# Patient Record
Sex: MALE | Race: WHITE | NOT HISPANIC OR LATINO | Employment: FULL TIME | URBAN - METROPOLITAN AREA
[De-identification: names, ages, dates, MRNs, and addresses within clinical notes are randomized per-mention and may not be internally consistent; named-entity substitution may affect disease eponyms.]

---

## 2019-07-10 ENCOUNTER — OFFICE VISIT (OUTPATIENT)
Dept: GASTROENTEROLOGY | Facility: CLINIC | Age: 29
End: 2019-07-10
Payer: COMMERCIAL

## 2019-07-10 VITALS
HEART RATE: 76 BPM | HEIGHT: 70 IN | WEIGHT: 209 LBS | DIASTOLIC BLOOD PRESSURE: 74 MMHG | SYSTOLIC BLOOD PRESSURE: 134 MMHG | BODY MASS INDEX: 29.92 KG/M2

## 2019-07-10 DIAGNOSIS — Z72.89 ALCOHOL USE: ICD-10-CM

## 2019-07-10 DIAGNOSIS — R42 DIZZINESS: Primary | ICD-10-CM

## 2019-07-10 PROCEDURE — 99244 OFF/OP CNSLTJ NEW/EST MOD 40: CPT | Performed by: INTERNAL MEDICINE

## 2019-07-10 RX ORDER — ESCITALOPRAM OXALATE 20 MG/1
20 TABLET ORAL DAILY
Refills: 3 | COMMUNITY
Start: 2019-06-23

## 2019-07-10 RX ORDER — MULTIVITAMIN
1 TABLET ORAL DAILY
COMMUNITY

## 2019-07-10 NOTE — PROGRESS NOTES
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  Patient and his wife feel that he exhibits signs of being intoxicated even when he is not drinking  I did explain to them that there is limited literature regarding this syndrome  However, given that the symptoms are intermittent in nature, I recommended that for the next 4 weeks, he stays completely abstinent from alcohol, do no more than 2 servings of carbohydrates day, and if despite this his symptoms resume, will check his blood work including LFTs, ethanol, and hemoglobin A1c     - Comprehensive metabolic panel  - Hemoglobin A1C  - Ethanol    2  Alcohol use  Drinks alcohol couple times a week  Given that this would confuse the picture, I recommend that he stays completely abstinent from all alcohol for the next 4 weeks  - Comprehensive metabolic panel; Future  - Hemoglobin A1C; Future  - Ethanol; Future      Followup Appointment[de-identified]  4 weeks  ______________________________________________________________________    Chief Complaint   Patient presents with    Dizziness    Gait Problem    Fatigue    Smells like alcohol       HPI:   Kin Juarez is a 33y o  year old male who presents today as a new patient at the request of Dr Hernandez for 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  Patient is here today with his wife  Patient states that he drinks alcohol couple times a week  He has been under lot of stress recently  His anxiety medication has been increased recently  He has put on about 15 lb in the past year  He and his wife state that even when he is not drinking, especially on Sundays, he exhibits signs of intoxication: slurred speech, fatigue, dizziness, lightheadedness    Wife also feels that she can small alcohol on him when he has not been drinking  Patient denies any issues with nausea vomiting, dysphagia, heartburn, abdominal pain, abdominal bloating, diarrhea, changes in bowel habits, GI bleeding  Historical Information   Past Medical History:   Diagnosis Date    Anxiety     Pericarditis      Past Surgical History:   Procedure Laterality Date    WISDOM TOOTH EXTRACTION       Social History     Substance and Sexual Activity   Alcohol Use Yes    Frequency: 2-3 times a week     Social History     Substance and Sexual Activity   Drug Use Never     Social History     Tobacco Use   Smoking Status Former Smoker    Packs/day: 0 50    Years: 10 00    Pack years: 5 00    Types: Cigarettes    Last attempt to quit:     Years since quittin 5   Smokeless Tobacco Never Used     Family History   Problem Relation Age of Onset    Colon cancer Neg Hx     Colon polyps Neg Hx     Inflammatory bowel disease Neg Hx        Meds/Allergies     Current Outpatient Medications:     escitalopram (LEXAPRO) 20 mg tablet    Multiple Vitamin (MULTIVITAMIN) tablet    No Known Allergies    PHYSICAL EXAM:    Blood pressure 134/74, pulse 76, height 5' 10" (1 778 m), weight 94 8 kg (209 lb)  Body mass index is 29 99 kg/m²  General Appearance: NAD, cooperative, alert  HEENT:  Normocephalic, atraumatic, anicteric  Neck:  Supple, symmetrical, trachea midline  Lungs:  Clear to auscultation bilaterally; no rales, rhonchi or wheezing; respirations unlabored   Heart[de-identified]  Regular rate and rhythm; no murmur, rub, or gallop    Abdomen:  Soft, non-tender, non-distended; normal bowel sounds; no masses, no organomegaly   Rectal:  Deferred   Extremities: No cyanosis, clubbing or edema   Skin:  No jaundice, rashes, or lesions   Lymph nodes: No palpable cervical lymphadenopathy  Psych: Normal affect, good eye contact  Neuro: No gross deficits, AAOx3    Lab Results:   No results found for: WBC, HGB, HCT, MCV, PLT  No results found for: NA, K, CL, CO2, ANIONGAP, BUN, CREATININE, GLUCOSE, GLUF, CALCIUM, CORRECTEDCA, AST, ALT, ALKPHOS, PROT, BILITOT, EGFR  No results found for: IRON, TIBC, FERRITIN  No results found for: LIPASE    Radiology Results:   No results found  REVIEW OF SYSTEMS:    CONSTITUTIONAL: Denies any fever, chills, rigors, and weight loss  HEENT: No earache or tinnitus  Denies hearing loss or visual disturbances  CARDIOVASCULAR: No chest pain or palpitations  RESPIRATORY: Denies any cough, hemoptysis, shortness of breath or dyspnea on exertion  GASTROINTESTINAL: As noted in the History of Present Illness  GENITOURINARY: No problems with urination  Denies any hematuria or dysuria  NEUROLOGIC:  Per HPI  MUSCULOSKELETAL: Denies any muscle or joint pain  SKIN: Denies skin rashes or itching  ENDOCRINE: Denies excessive thirst  Denies intolerance to heat or cold  PSYCHOSOCIAL: Denies depression  + anxiety  Denies any recent memory loss

## 2019-07-10 NOTE — LETTER
July 10, 2019     Soraya Days, DO  [id number]    Patient: Isaías Soares   YOB: 1990   Date of Visit: 7/10/2019       Dear Dr Navneet Dickerson: Thank you for referring Isaías Soares to me for evaluation  Below are my notes for this consultation  If you have questions, please do not hesitate to call me  I look forward to following your patient along with you  Sincerely,        Seymour Lacy MD        CC: No Recipients  Seymour Lacy MD  7/10/2019 12:34 PM  Incomplete    [address] [facility] - Outpatient Consultation  Isaías Soares 33 y o  male MRN: [id number]  Encounter: [id number]    ASSESSMENT AND PLAN:      1  Dizziness  81-year-old male here today as a new patient at the request of Dr Navneet Dickerson for 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  Patient and his wife feel that he exhibits signs of being intoxicated even when he is not drinking  I did explain to them that there is limited literature regarding this syndrome  However, given that the symptoms are intermittent in nature, I recommended that for the next 4 weeks, he stays completely abstinent from alcohol, do no more than 2 servings of carbohydrates day, and if despite this his symptoms resume, will check his blood work including LFTs, ethanol, and hemoglobin A1c     - Comprehensive metabolic panel  - Hemoglobin A1C  - Ethanol    2  Alcohol use  Drinks alcohol couple times a week  Given that this would confuse the picture, I recommend that he stays completely abstinent from all alcohol for the next 4 weeks  - Comprehensive metabolic panel; Future  - Hemoglobin A1C; Future  - Ethanol;  Future      Followup Appointment[de-identified]  4 weeks  ______________________________________________________________________    Chief Complaint   Patient presents with    Dizziness    Gait Problem    Fatigue    Smells like alcohol       HPI:   Isaías Soares is a 33y o  year old male who presents today as a new patient at the request of Len Montanez for 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  Patient is here today with his wife  Patient states that he drinks alcohol couple times a week  He has been under lot of stress recently  His anxiety medication has been increased recently  He has put on about 15 lb in the past year  He and his wife state that even when he is not drinking, especially on Sundays, he exhibits signs of intoxication: slurred speech, fatigue, dizziness, lightheadedness  Wife also feels that she can small alcohol on him when he has not been drinking  Patient denies any issues with nausea vomiting, dysphagia, heartburn, abdominal pain, abdominal bloating, diarrhea, changes in bowel habits, GI bleeding  Historical Information   Past Medical History:   Diagnosis Date    Anxiety     Pericarditis      Past Surgical History:   Procedure Laterality Date    WISDOM TOOTH EXTRACTION       Social History     Substance and Sexual Activity   Alcohol Use Yes    Frequency: 2-3 times a week     Social History     Substance and Sexual Activity   Drug Use Never     Social History     Tobacco Use   Smoking Status Former Smoker    Packs/day: 0 50    Years: 10 00    Pack years: 5 00    Types: Cigarettes    Last attempt to quit:     Years since quittin 5   Smokeless Tobacco Never Used     Family History   Problem Relation Age of Onset    Colon cancer Neg Hx     Colon polyps Neg Hx     Inflammatory bowel disease Neg Hx        Meds/Allergies     Current Outpatient Medications:     escitalopram (LEXAPRO) 20 mg tablet    Multiple Vitamin (MULTIVITAMIN) tablet    No Known Allergies    PHYSICAL EXAM:    Blood pressure 134/74, pulse 76, height 5' 10" (1 778 m), weight 94 8 kg (209 lb)  Body mass index is 29 99 kg/m²  General Appearance: NAD, cooperative, alert  HEENT:  Normocephalic, atraumatic, anicteric      Neck:  Supple, symmetrical, trachea midline  Lungs:  Clear to auscultation bilaterally; no rales, rhonchi or wheezing; respirations unlabored   Heart[de-identified]  Regular rate and rhythm; no murmur, rub, or gallop  Abdomen:  Soft, non-tender, non-distended; normal bowel sounds; no masses, no organomegaly   Rectal:  Deferred   Extremities: No cyanosis, clubbing or edema   Skin:  No jaundice, rashes, or lesions   Lymph nodes: No palpable cervical lymphadenopathy  Psych: Normal affect, good eye contact  Neuro: No gross deficits, AAOx3    Lab Results:   No results found for: WBC, HGB, HCT, MCV, PLT  No results found for: NA, K, CL, CO2, ANIONGAP, BUN, CREATININE, GLUCOSE, GLUF, CALCIUM, CORRECTEDCA, AST, ALT, ALKPHOS, PROT, BILITOT, EGFR  No results found for: IRON, TIBC, FERRITIN  No results found for: LIPASE    Radiology Results:   No results found  REVIEW OF SYSTEMS:    CONSTITUTIONAL: Denies any fever, chills, rigors, and weight loss  HEENT: No earache or tinnitus  Denies hearing loss or visual disturbances  CARDIOVASCULAR: No chest pain or palpitations  RESPIRATORY: Denies any cough, hemoptysis, shortness of breath or dyspnea on exertion  GASTROINTESTINAL: As noted in the History of Present Illness  GENITOURINARY: No problems with urination  Denies any hematuria or dysuria  NEUROLOGIC:  Per HPI  MUSCULOSKELETAL: Denies any muscle or joint pain  SKIN: Denies skin rashes or itching  ENDOCRINE: Denies excessive thirst  Denies intolerance to heat or cold  PSYCHOSOCIAL: Denies depression  + anxiety  Denies any recent memory loss

## 2019-07-27 LAB
ALBUMIN SERPL-MCNC: 5.4 G/DL (ref 3.5–5.5)
ALBUMIN/GLOB SERPL: 2.6 {RATIO} (ref 1.2–2.2)
ALP SERPL-CCNC: 77 IU/L (ref 39–117)
ALT SERPL-CCNC: 50 IU/L (ref 0–44)
AST SERPL-CCNC: 44 IU/L (ref 0–40)
BILIRUB SERPL-MCNC: 0.4 MG/DL (ref 0–1.2)
BUN SERPL-MCNC: 16 MG/DL (ref 6–20)
BUN/CREAT SERPL: 17 (ref 9–20)
CALCIUM SERPL-MCNC: 9.7 MG/DL (ref 8.7–10.2)
CHLORIDE SERPL-SCNC: 96 MMOL/L (ref 96–106)
CO2 SERPL-SCNC: 21 MMOL/L (ref 20–29)
CREAT SERPL-MCNC: 0.96 MG/DL (ref 0.76–1.27)
EST. AVERAGE GLUCOSE BLD GHB EST-MCNC: 100 MG/DL
ETHANOL BLD GC-MCNC: NEGATIVE %
GLOBULIN SER-MCNC: 2.1 G/DL (ref 1.5–4.5)
GLUCOSE SERPL-MCNC: 90 MG/DL (ref 65–99)
HBA1C MFR BLD: 5.1 % (ref 4.8–5.6)
POTASSIUM SERPL-SCNC: 3.9 MMOL/L (ref 3.5–5.2)
PROT SERPL-MCNC: 7.5 G/DL (ref 6–8.5)
SL AMB EGFR AFRICAN AMERICAN: 125 ML/MIN/1.73
SL AMB EGFR NON AFRICAN AMERICAN: 108 ML/MIN/1.73
SODIUM SERPL-SCNC: 137 MMOL/L (ref 134–144)

## 2019-07-29 NOTE — RESULT ENCOUNTER NOTE
Elevated AST/ALT  Has follow up with me next week  Will review and will need to work up abnormal LFTs

## 2019-08-08 ENCOUNTER — OFFICE VISIT (OUTPATIENT)
Dept: GASTROENTEROLOGY | Facility: CLINIC | Age: 29
End: 2019-08-08
Payer: COMMERCIAL

## 2019-08-08 VITALS
WEIGHT: 201 LBS | BODY MASS INDEX: 28.77 KG/M2 | HEIGHT: 70 IN | DIASTOLIC BLOOD PRESSURE: 90 MMHG | SYSTOLIC BLOOD PRESSURE: 142 MMHG | HEART RATE: 74 BPM

## 2019-08-08 DIAGNOSIS — Z72.89 ALCOHOL USE: ICD-10-CM

## 2019-08-08 DIAGNOSIS — R79.89 ELEVATED LFTS: Primary | ICD-10-CM

## 2019-08-08 DIAGNOSIS — R42 DIZZINESS: ICD-10-CM

## 2019-08-08 PROCEDURE — 99214 OFFICE O/P EST MOD 30 MIN: CPT | Performed by: INTERNAL MEDICINE

## 2019-08-08 RX ORDER — NAPROXEN 250 MG/1
250 TABLET ORAL 2 TIMES DAILY WITH MEALS
COMMUNITY

## 2019-08-08 NOTE — PROGRESS NOTES
[facility] - Outpatient Follow-up Note  Lc Cooley 33 y o  male MRN: [id number]  Encounter: [id number]    ASSESSMENT AND PLAN:      1  Elevated LFTs  Abnormal LFTs noted on recent blood work  Will workup  Recommend ongoing alcohol cessation     - Comprehensive metabolic panel; Future  - CBC and differential; Future  - Hepatitis B surface antigen; Future  - Hepatitis A antibody, total; Future  - Hepatitis B core antibody, IgM; Future  - Hepatitis B surface antibody; Future  - Hepatitis C antibody; Future  - CALIN Screen w/ Reflex to Titer/Pattern; Future  - Antimitochondrial antibody; Future  - Anti-smooth muscle antibody, IgG; Future  - Celiac Disease Antibody Profile; Future  - Ceruloplasmin; Future  - Iron Saturation %; Future  - Ferritin; Future  - US abdomen complete; Future    2  Dizziness  Has been on a low carb, less than 25 g per day, no alcohol for the past 4 weeks  Still had couple episodes  We had blood drawn during these episodes but blood alcohol level is undetectable  So not really clear that he is having auto brewery syndrome  At this point, I recommend that he follows up with a neurologist to rule out focal seizures given his episodes  If Neurology workup otherwise unremarkable, would recommend trying a lactulose breath test to assess for small intestinal bacterial overgrowth as there are some literature supporting abnormal bacterial from mentation from the gut  Also, consider doing the glucose and alcohol protocol after high sugar load  3  Alcohol use  Continue abstinence      Followup Appointment:  3 months  ______________________________________________________________________    Chief Complaint   Patient presents with    Follow up to Labs     HPI:  This is a 28-year-old male here today for follow-up    At last visit, he and his wife are concerned about his episodes of dizziness and "acting like a drunk"despite the fact that he was not drinking any alcohol  However at that time, he was not really on the regulated diet nor was he completely alcohol abstinent  So we did try this and he is here today for follow-up  He states that despite doing the low carb diet and complete alcohol abstinence, he is still having some of these episodes  Mainly of dizziness  He has a neurology follow-up coming up in 2 weeks  Historical Information   Past Medical History:   Diagnosis Date    Anxiety     Pericarditis      Past Surgical History:   Procedure Laterality Date    WISDOM TOOTH EXTRACTION       Social History     Substance and Sexual Activity   Alcohol Use Yes    Frequency: 2-3 times a week     Social History     Substance and Sexual Activity   Drug Use Never     Social History     Tobacco Use   Smoking Status Former Smoker    Packs/day: 0 50    Years: 10 00    Pack years: 5 00    Types: Cigarettes    Last attempt to quit:     Years since quittin 6   Smokeless Tobacco Never Used     Family History   Problem Relation Age of Onset    Colon cancer Neg Hx     Colon polyps Neg Hx     Inflammatory bowel disease Neg Hx          Current Outpatient Medications:     escitalopram (LEXAPRO) 20 mg tablet    Multiple Vitamin (MULTIVITAMIN) tablet    naproxen (NAPROSYN) 250 mg tablet  No Known Allergies    10 Point REVIEW OF SYSTEMS IS OTHERWISE NEGATIVE  PHYSICAL EXAM:    Blood pressure 142/90, pulse 74, height 5' 10" (1 778 m), weight 91 2 kg (201 lb)  Body mass index is 28 84 kg/m²  General Appearance:  Alert, cooperative, no distress  HEENT:  Normocephalic, atraumatic, anicteric  Neck: Supple, symmetrical, trachea midline  Lungs: Clear to auscultation bilaterally; no rales, rhonchi or wheezing; respirations unlabored   Heart: Regular rate and rhythm; no murmur, rub, or gallop    Abdomen:   Soft, non-tender, non-distended; normal bowel sounds; no masses, no organomegaly   Rectal:  Deferred   Extremities:  No cyanosis, clubbing or edema Skin:  No jaundice, rashes, or lesions   Lymph nodes: No palpable cervical lymphadenopathy     Lab Results:   No results found for: WBC, HGB, HCT, MCV, PLT  Lab Results   Component Value Date    K 3 9 07/25/2019    CL 96 07/25/2019    CO2 21 07/25/2019    BUN 16 07/25/2019    CREATININE 0 96 07/25/2019    AST 44 (H) 07/25/2019    ALT 50 (H) 07/25/2019     No results found for: IRON, TIBC, FERRITIN  No results found for: LIPASE    Radiology Results:   No results found

## 2019-08-08 NOTE — LETTER
August 8, 2019     Soraya Cobb, DO  [address] [id number]    Patient: Isaías Soares   YOB: 1990   Date of Visit: 8/8/2019       Dear Dr Navneet Dickerson: Thank you for referring Isaías Soares to me for evaluation  Below are my notes for this consultation  If you have questions, please do not hesitate to call me  I look forward to following your patient along with you  Sincerely,        Seymour Lacy MD        CC: No Recipients  Seymour Lacy MD  8/8/2019  5:24 PM  Sign at close encounter  [facility] - Outpatient Follow-up Note  Isaías Soares 33 y o  male MRN: [id number]  Encounter: [id number]    ASSESSMENT AND PLAN:      1  Elevated LFTs  Abnormal LFTs noted on recent blood work  Will workup  Recommend ongoing alcohol cessation     - Comprehensive metabolic panel; Future  - CBC and differential; Future  - Hepatitis B surface antigen; Future  - Hepatitis A antibody, total; Future  - Hepatitis B core antibody, IgM; Future  - Hepatitis B surface antibody; Future  - Hepatitis C antibody; Future  - CALIN Screen w/ Reflex to Titer/Pattern; Future  - Antimitochondrial antibody; Future  - Anti-smooth muscle antibody, IgG; Future  - Celiac Disease Antibody Profile; Future  - Ceruloplasmin; Future  - Iron Saturation %; Future  - Ferritin; Future  - US abdomen complete; Future    2  Dizziness  Has been on a low carb, less than 25 g per day, no alcohol for the past 4 weeks  Still had couple episodes  We had blood drawn during these episodes but blood alcohol level is undetectable  So not really clear that he is having auto brewery syndrome  At this point, I recommend that he follows up with a neurologist to rule out focal seizures given his episodes      If Neurology workup otherwise unremarkable, would recommend trying a lactulose breath test to assess for small intestinal bacterial overgrowth as there are some literature supporting abnormal bacterial from mentation from the gut  Also, consider doing the glucose and alcohol protocol after high sugar load  3  Alcohol use  Continue abstinence      Followup Appointment:  3 months  ______________________________________________________________________    Chief Complaint   Patient presents with    Follow up to Labs     HPI:  This is a 52-year-old male here today for follow-up  At last visit, he and his wife are concerned about his episodes of dizziness and "acting like a drunk"despite the fact that he was not drinking any alcohol  However at that time, he was not really on the regulated diet nor was he completely alcohol abstinent  So we did try this and he is here today for follow-up  He states that despite doing the low carb diet and complete alcohol abstinence, he is still having some of these episodes  Mainly of dizziness  He has a neurology follow-up coming up in 2 weeks  Historical Information   Past Medical History:   Diagnosis Date    Anxiety     Pericarditis      Past Surgical History:   Procedure Laterality Date    WISDOM TOOTH EXTRACTION       Social History     Substance and Sexual Activity   Alcohol Use Yes    Frequency: 2-3 times a week     Social History     Substance and Sexual Activity   Drug Use Never     Social History     Tobacco Use   Smoking Status Former Smoker    Packs/day: 0 50    Years: 10 00    Pack years: 5 00    Types: Cigarettes    Last attempt to quit: 2012    Years since quittin 6   Smokeless Tobacco Never Used     Family History   Problem Relation Age of Onset    Colon cancer Neg Hx     Colon polyps Neg Hx     Inflammatory bowel disease Neg Hx          Current Outpatient Medications:     escitalopram (LEXAPRO) 20 mg tablet    Multiple Vitamin (MULTIVITAMIN) tablet    naproxen (NAPROSYN) 250 mg tablet  No Known Allergies    10 Point REVIEW OF SYSTEMS IS OTHERWISE NEGATIVE      PHYSICAL EXAM:    Blood pressure 142/90, pulse 74, height 5' 10" (1 778 m), weight 91 2 kg (201 lb)  Body mass index is 28 84 kg/m²  General Appearance:  Alert, cooperative, no distress  HEENT:  Normocephalic, atraumatic, anicteric  Neck: Supple, symmetrical, trachea midline  Lungs: Clear to auscultation bilaterally; no rales, rhonchi or wheezing; respirations unlabored   Heart: Regular rate and rhythm; no murmur, rub, or gallop  Abdomen:   Soft, non-tender, non-distended; normal bowel sounds; no masses, no organomegaly   Rectal:  Deferred   Extremities:  No cyanosis, clubbing or edema   Skin:  No jaundice, rashes, or lesions   Lymph nodes: No palpable cervical lymphadenopathy     Lab Results:   No results found for: WBC, HGB, HCT, MCV, PLT  Lab Results   Component Value Date    K 3 9 07/25/2019    CL 96 07/25/2019    CO2 21 07/25/2019    BUN 16 07/25/2019    CREATININE 0 96 07/25/2019    AST 44 (H) 07/25/2019    ALT 50 (H) 07/25/2019     No results found for: IRON, TIBC, FERRITIN  No results found for: LIPASE    Radiology Results:   No results found

## 2019-08-17 LAB
ACTIN IGG SERPL-ACNC: 6 UNITS (ref 0–19)
ALBUMIN SERPL-MCNC: 4.9 G/DL (ref 3.5–5.5)
ALBUMIN/GLOB SERPL: 2.6 {RATIO} (ref 1.2–2.2)
ALP SERPL-CCNC: 72 IU/L (ref 39–117)
ALT SERPL-CCNC: 32 IU/L (ref 0–44)
ANA SER QL: NEGATIVE
AST SERPL-CCNC: 27 IU/L (ref 0–40)
BASOPHILS # BLD AUTO: 0 X10E3/UL (ref 0–0.2)
BASOPHILS NFR BLD AUTO: 1 %
BILIRUB SERPL-MCNC: 0.3 MG/DL (ref 0–1.2)
BUN SERPL-MCNC: 22 MG/DL (ref 6–20)
BUN/CREAT SERPL: 24 (ref 9–20)
CALCIUM SERPL-MCNC: 9.6 MG/DL (ref 8.7–10.2)
CERULOPLASMIN SERPL-MCNC: 20.4 MG/DL (ref 16–31)
CHLORIDE SERPL-SCNC: 97 MMOL/L (ref 96–106)
CO2 SERPL-SCNC: 22 MMOL/L (ref 20–29)
CREAT SERPL-MCNC: 0.9 MG/DL (ref 0.76–1.27)
ENDOMYSIUM IGA SER QL: NEGATIVE
EOSINOPHIL # BLD AUTO: 0.2 X10E3/UL (ref 0–0.4)
EOSINOPHIL NFR BLD AUTO: 4 %
ERYTHROCYTE [DISTWIDTH] IN BLOOD BY AUTOMATED COUNT: 13.1 % (ref 12.3–15.4)
FERRITIN SERPL-MCNC: 176 NG/ML (ref 30–400)
GLIADIN PEPTIDE IGA SER-ACNC: 3 UNITS (ref 0–19)
GLIADIN PEPTIDE IGG SER-ACNC: 2 UNITS (ref 0–19)
GLOBULIN SER-MCNC: 1.9 G/DL (ref 1.5–4.5)
GLUCOSE SERPL-MCNC: 102 MG/DL (ref 65–99)
HAV AB SER QL IA: NEGATIVE
HBV CORE IGM SERPL QL IA: NEGATIVE
HBV SURFACE AB SER-ACNC: <3.1 MIU/ML
HBV SURFACE AG SERPL QL IA: NEGATIVE
HCT VFR BLD AUTO: 42.2 % (ref 37.5–51)
HCV AB S/CO SERPL IA: <0.1 S/CO RATIO (ref 0–0.9)
HGB BLD-MCNC: 14.5 G/DL (ref 13–17.7)
IGA SERPL-MCNC: 116 MG/DL (ref 90–386)
IMM GRANULOCYTES # BLD: 0 X10E3/UL (ref 0–0.1)
IMM GRANULOCYTES NFR BLD: 0 %
IRON SATN MFR SERPL: 31 % (ref 15–55)
IRON SERPL-MCNC: 91 UG/DL (ref 38–169)
LYMPHOCYTES # BLD AUTO: 1.8 X10E3/UL (ref 0.7–3.1)
LYMPHOCYTES NFR BLD AUTO: 35 %
MCH RBC QN AUTO: 31.4 PG (ref 26.6–33)
MCHC RBC AUTO-ENTMCNC: 34.4 G/DL (ref 31.5–35.7)
MCV RBC AUTO: 91 FL (ref 79–97)
MITOCHONDRIA M2 IGG SER-ACNC: <20 UNITS (ref 0–20)
MONOCYTES # BLD AUTO: 0.6 X10E3/UL (ref 0.1–0.9)
MONOCYTES NFR BLD AUTO: 11 %
NEUTROPHILS # BLD AUTO: 2.6 X10E3/UL (ref 1.4–7)
NEUTROPHILS NFR BLD AUTO: 49 %
PLATELET # BLD AUTO: 192 X10E3/UL (ref 150–450)
POTASSIUM SERPL-SCNC: 4.5 MMOL/L (ref 3.5–5.2)
PROT SERPL-MCNC: 6.8 G/DL (ref 6–8.5)
RBC # BLD AUTO: 4.62 X10E6/UL (ref 4.14–5.8)
SL AMB EGFR AFRICAN AMERICAN: 135 ML/MIN/1.73
SL AMB EGFR NON AFRICAN AMERICAN: 117 ML/MIN/1.73
SODIUM SERPL-SCNC: 137 MMOL/L (ref 134–144)
TIBC SERPL-MCNC: 289 UG/DL (ref 250–450)
TTG IGA SER-ACNC: <2 U/ML (ref 0–3)
TTG IGG SER-ACNC: <2 U/ML (ref 0–5)
UIBC SERPL-MCNC: 198 UG/DL (ref 111–343)
WBC # BLD AUTO: 5.2 X10E3/UL (ref 3.4–10.8)

## 2019-10-16 ENCOUNTER — OFFICE VISIT (OUTPATIENT)
Dept: GASTROENTEROLOGY | Facility: CLINIC | Age: 29
End: 2019-10-16
Payer: COMMERCIAL

## 2019-10-16 ENCOUNTER — TELEPHONE (OUTPATIENT)
Dept: GASTROENTEROLOGY | Facility: CLINIC | Age: 29
End: 2019-10-16

## 2019-10-16 VITALS
WEIGHT: 216 LBS | DIASTOLIC BLOOD PRESSURE: 84 MMHG | BODY MASS INDEX: 30.92 KG/M2 | HEIGHT: 70 IN | HEART RATE: 59 BPM | SYSTOLIC BLOOD PRESSURE: 120 MMHG

## 2019-10-16 DIAGNOSIS — R79.89 ELEVATED LFTS: ICD-10-CM

## 2019-10-16 DIAGNOSIS — R42 DIZZINESS: Primary | ICD-10-CM

## 2019-10-16 DIAGNOSIS — Z72.89 ALCOHOL USE: ICD-10-CM

## 2019-10-16 PROCEDURE — 99213 OFFICE O/P EST LOW 20 MIN: CPT | Performed by: INTERNAL MEDICINE

## 2019-10-16 NOTE — TELEPHONE ENCOUNTER
I faxed Auto-Brewery Syndrome Testing Orders to Abhijit Salvador at [address]  [phone]  I left a vm for Abhijit Salvador or [facility] to discuss scheduling procedure, potential procedure codes they will use? Do we need to obtain pre-certification? Patient will be fasting 12 hours prior  Patient asked that I contact his wife Vicki Joyce [phone] to discuss details of the procedure once I receive clarification

## 2019-10-16 NOTE — PROGRESS NOTES
[id number] - Outpatient Follow-up Note  Nicol Hi 30 y o  male MRN: [id number]  Encounter: [id number]    ASSESSMENT AND PLAN:      1  Dizziness  41-year-old male who returns today for follow-up of his dizziness  Appears that he had 2 more episodes of intoxication without drinking any alcohol  At 1 point, he did go to the urgent care center and his blood alcohol level was markedly elevated  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  - will do a glucose loading protocol to check his blood alcohol and blood glucose level for 4 hours  - if the alcohol level is going up after a glucose load, I recommend trying lactulose breath test with Xifaxan following with align  - if this does not help, another option would be to go on a 0 carb diet  - finally, some studies have shown improvement with 6-8 weeks of fluconazole  - at 1 point, I do recommend that he follows through with his neurology appointment    2  Elevated LFTs  Abnormal LFTs noted on recent blood work  Full workup revealed normal ultrasound, negative for autoimmune, viral, metabolic etiologies  - patient will follow up with his PCP to get hepatitis-B vaccination  - repeat LFTs normalized  - we will monitor his LFTs every year    3  Alcohol use  Did drink alcohol couple occasions at a wedding  I did advise that he limit all alcohol intake  Followup Appointment:  6 months  ______________________________________________________________________    Chief Complaint   Patient presents with    Follow-up     labs / us     HPI:  41-year-old male here today for follow-up  It appears that he continues to have episodes of dizziness and acting like a drunk in setting of not drinking alcohol  The past, we tried low carb diet, less than 25 g per day  However this still had couple episodes  He never followed up with the Neurology appointment      Historical Information   Past Medical History:   Diagnosis Date    Anxiety  Pericarditis      Past Surgical History:   Procedure Laterality Date    WISDOM TOOTH EXTRACTION       Social History     Substance and Sexual Activity   Alcohol Use Yes    Frequency: 2-3 times a week     Social History     Substance and Sexual Activity   Drug Use Never     Social History     Tobacco Use   Smoking Status Former Smoker    Packs/day: 0 50    Years: 10 00    Pack years: 5 00    Types: Cigarettes    Last attempt to quit:     Years since quittin 7   Smokeless Tobacco Never Used     Family History   Problem Relation Age of Onset    Colon cancer Neg Hx     Colon polyps Neg Hx     Inflammatory bowel disease Neg Hx          Current Outpatient Medications:     escitalopram (LEXAPRO) 20 mg tablet    Multiple Vitamin (MULTIVITAMIN) tablet    naproxen (NAPROSYN) 250 mg tablet  No Known Allergies  Reviewed medications and allergies and updated as indicated    PHYSICAL EXAM:    Blood pressure 120/84, pulse 59, height 5' 10" (1 778 m), weight 98 kg (216 lb)  Body mass index is 30 99 kg/m²  General Appearance: NAD, cooperative, alert  Eyes: Anicteric, PERRLA, EOMI  ENT:  Normocephalic, atraumatic, normal mucosa  Neck:  Supple, symmetrical, trachea midline  Resp:  Clear to auscultation bilaterally; no rales, rhonchi or wheezing; respirations unlabored   CV:  S1 S2, Regular rate and rhythm; no murmur, rub, or gallop  GI:  Soft, non-tender, non-distended; normal bowel sounds; no masses, no organomegaly   Rectal: Deferred  Musculoskeletal: No cyanosis, clubbing or edema  Normal ROM    Skin:  No jaundice, rashes, or lesions   Heme/Lymph: No palpable cervical lymphadenopathy  Psych: Normal affect, good eye contact  Neuro: No gross deficits, AAOx3    Lab Results:   Lab Results   Component Value Date    WBC 5 2 2019    HGB 14 5 2019    HCT 42 2 2019    MCV 91 2019     2019     Lab Results   Component Value Date    K 4 5 2019    CL 97 2019 CO2 22 08/16/2019    BUN 22 (H) 08/16/2019    CREATININE 0 90 08/16/2019    AST 27 08/16/2019    ALT 32 08/16/2019     Lab Results   Component Value Date    IRON 91 08/16/2019    TIBC 289 08/16/2019    FERRITIN 176 08/16/2019     No results found for: LIPASE    Radiology Results:   No results found

## 2019-10-16 NOTE — LETTER
October 16, 2019     John Marin DO  [address] [address]    Patient: Sd Gtz   YOB: 1990   Date of Visit: 10/16/2019       Dear Dr Nicol Antunez: Thank you for referring Sd Gtz to me for evaluation  Below are my notes for this consultation  If you have questions, please do not hesitate to call me  I look forward to following your patient along with you  Sincerely,        Jessica Anguiano MD        CC: No Recipients  Jessica Anguiano MD  10/16/2019  5:15 PM  Sign at close encounter  [facility] - Outpatient Follow-up Note  Sd Gtz 30 y o  male MRN: [id number]  Encounter: [id number]    ASSESSMENT AND PLAN:      1  Dizziness  20-year-old male who returns today for follow-up of his dizziness  Appears that he had 2 more episodes of intoxication without drinking any alcohol  At 1 point, he did go to the urgent care center and his blood alcohol level was markedly elevated  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  - will do a glucose loading protocol to check his blood alcohol and blood glucose level for 4 hours  - if the alcohol level is going up after a glucose load, I recommend trying lactulose breath test with Xifaxan following with align  - if this does not help, another option would be to go on a 0 carb diet  - finally, some studies have shown improvement with 6-8 weeks of fluconazole  - at 1 point, I do recommend that he follows through with his neurology appointment    2  Elevated LFTs  Abnormal LFTs noted on recent blood work  Full workup revealed normal ultrasound, negative for autoimmune, viral, metabolic etiologies  - patient will follow up with his PCP to get hepatitis-B vaccination  - repeat LFTs normalized  - we will monitor his LFTs every year    3  Alcohol use  Did drink alcohol couple occasions at a wedding  I did advise that he limit all alcohol intake        Followup Appointment:  6 months  ______________________________________________________________________    Chief Complaint   Patient presents with    Follow-up     labs / us     HPI:  43-year-old male here today for follow-up  It appears that he continues to have episodes of dizziness and acting like a drunk in setting of not drinking alcohol  The past, we tried low carb diet, less than 25 g per day  However this still had couple episodes  He never followed up with the Neurology appointment  Historical Information   Past Medical History:   Diagnosis Date    Anxiety     Pericarditis      Past Surgical History:   Procedure Laterality Date    WISDOM TOOTH EXTRACTION       Social History     Substance and Sexual Activity   Alcohol Use Yes    Frequency: 2-3 times a week     Social History     Substance and Sexual Activity   Drug Use Never     Social History     Tobacco Use   Smoking Status Former Smoker    Packs/day: 0 50    Years: 10 00    Pack years: 5 00    Types: Cigarettes    Last attempt to quit:     Years since quittin 7   Smokeless Tobacco Never Used     Family History   Problem Relation Age of Onset    Colon cancer Neg Hx     Colon polyps Neg Hx     Inflammatory bowel disease Neg Hx          Current Outpatient Medications:     escitalopram (LEXAPRO) 20 mg tablet    Multiple Vitamin (MULTIVITAMIN) tablet    naproxen (NAPROSYN) 250 mg tablet  No Known Allergies  Reviewed medications and allergies and updated as indicated    PHYSICAL EXAM:    Blood pressure 120/84, pulse 59, height 5' 10" (1 778 m), weight 98 kg (216 lb)  Body mass index is 30 99 kg/m²  General Appearance: NAD, cooperative, alert  Eyes: Anicteric, PERRLA, EOMI  ENT:  Normocephalic, atraumatic, normal mucosa  Neck:  Supple, symmetrical, trachea midline  Resp:  Clear to auscultation bilaterally; no rales, rhonchi or wheezing; respirations unlabored   CV:  S1 S2, Regular rate and rhythm; no murmur, rub, or gallop    GI:  Soft, non-tender, non-distended; normal bowel sounds; no masses, no organomegaly   Rectal: Deferred  Musculoskeletal: No cyanosis, clubbing or edema  Normal ROM  Skin:  No jaundice, rashes, or lesions   Heme/Lymph: No palpable cervical lymphadenopathy  Psych: Normal affect, good eye contact  Neuro: No gross deficits, AAOx3    Lab Results:   Lab Results   Component Value Date    WBC 5 2 08/16/2019    HGB 14 5 08/16/2019    HCT 42 2 08/16/2019    MCV 91 08/16/2019     08/16/2019     Lab Results   Component Value Date    K 4 5 08/16/2019    CL 97 08/16/2019    CO2 22 08/16/2019    BUN 22 (H) 08/16/2019    CREATININE 0 90 08/16/2019    AST 27 08/16/2019    ALT 32 08/16/2019     Lab Results   Component Value Date    IRON 91 08/16/2019    TIBC 289 08/16/2019    FERRITIN 176 08/16/2019     No results found for: LIPASE    Radiology Results:   No results found

## 2019-10-25 NOTE — TELEPHONE ENCOUNTER
Having trouble getting the insurance to approve the high glucose tolerance test while serially monitoring the blood sugar and blood alcohol level  At this point, if still no response by next week, I recommend giving the patient 2 options:    1  Do a 2 week course of Xifaxan (ibs-d dose) followed by high-dose probiotics for 6 weeks    2  Do a 6-8 weeks of strict 0 carbohydrate diet    ----    Will need a script for blood alcohol level to be performed when he has an episode

## 2019-10-28 NOTE — TELEPHONE ENCOUNTER
I left a vm for wife Jarod Robb advising that we are having difficulty getting insurance to approve  I have not heard back from [facility] which is his mandated lab facility  I provided two alternative options as outlined below  I requested Jarod Robb or Nani Buitrago contact me tomorrow to discuss

## 2019-11-07 NOTE — TELEPHONE ENCOUNTER
I called wife GABRIELA [phone]  Left  with update regarding testing at [facility] spoke with the Test Master Department   They advised for the glucose intolerance (non-pregnant adult dose 75gm glucose) use test code 223559 ( it's not listed under [facility] regular test menu) CPT codes: 27124, 82952 x 2

## 2019-11-12 NOTE — TELEPHONE ENCOUNTER
I entered lab orders & faxed to [facility] in [facility]  I requested the lab team call me to discuss the blood draw schedule  Awaiting to hear back

## 2019-11-12 NOTE — TELEPHONE ENCOUNTER
I spoke with Garo Odonnell from [facility], she confirmed orders were received  She doesn't see a problem, understands directions  I called wife Luda Shah to provide update  She will discuss with Kinjal Dolan & let me know when testing is scheduled for

## 2019-11-12 NOTE — TELEPHONE ENCOUNTER
I spoke with Gabrielle from [facility], I was able to finalize testing & procedure codes for the Auto-Brewery Syndrome Testing      Ethanol, Whole Blood   Test Code: 217002   CPT: 10800   Needs 5 separate time specific orders  1) Need Baseline Ethanol upon arrival & fasting 12 hours  2) Draw Ethanol at 1 hour from baseline draw  3) Draw Ethanol at 2 hours from baseline draw  4) Draw Ethanol at 3 hours from baseline draw  5) Draw Ethanol at 4 hours from baseline draw    Glucose Tolerance Testing (GTT)  (5 Blood Specimens) Nonpregnant adult: 75-gram glucose load   Test Code: 612491   JSP:59337 & 82952 x 2  1) Need Baseline Glucose upon arrival & fasting 12 hours  2) Draw Glucose  at 1 hour from baseline draw  3) Draw Glucose at 2 hours from baseline draw  4) Draw Glucose  at 3 hours from baseline draw  5) Draw Glucose  at 4 hours from baseline draw

## 2019-11-30 LAB
GLUCOSE 1.5H P 75 G GLC PO SERPL-MCNC: ABNORMAL MG/DL
GLUCOSE 1H P 75 G GLC PO SERPL-MCNC: 104 MG/DL (ref 65–199)
GLUCOSE 2H P 75 G GLC PO SERPL-MCNC: 78 MG/DL (ref 65–139)
GLUCOSE 30M P 75 G GLC PO SERPL-MCNC: ABNORMAL MG/DL
GLUCOSE 3H P 75 G GLC PO SERPL-MCNC: 75 MG/DL (ref 65–109)
GLUCOSE 4H P 75 G GLC PO SERPL-MCNC: 81 MG/DL (ref 65–109)
GLUCOSE 5H P 75 G GLC PO SERPL-MCNC: ABNORMAL MG/DL
GLUCOSE 6H P 75 G GLC PO SERPL-MCNC: ABNORMAL MG/DL
GLUCOSE P FAST SERPL-MCNC: 102 MG/DL (ref 65–99)

## 2019-12-04 LAB
ETHANOL BLD GC-MCNC: NEGATIVE %

## 2019-12-08 NOTE — RESULT ENCOUNTER NOTE
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

## 2019-12-09 NOTE — TELEPHONE ENCOUNTER
I spoke with patients wife Shabnam Argueta, relayed results  Scheduled a follow up visit for 1/14/19 with Dr Blanca Acosta

## 2020-12-10 ENCOUNTER — TELEPHONE (OUTPATIENT)
Dept: GASTROENTEROLOGY | Facility: CLINIC | Age: 30
End: 2020-12-10

## 2020-12-10 NOTE — TELEPHONE ENCOUNTER
Patients wife Darya Mcnulty called requesting records to take to Dr Camelia Carrillo (Gastroenterologist in [place])  ZAWIN:[phone]     Fax: [phone]   Records printed and available for  today

## 2020-12-13 LAB
EXTERNAL CHLAMYDIA RESULT: NEGATIVE
N GONORRHOEA RRNA SPEC QL PROBE: NEGATIVE

## 2022-06-01 ENCOUNTER — TELEPHONE (OUTPATIENT)
Dept: GASTROENTEROLOGY | Facility: CLINIC | Age: 32
End: 2022-06-01

## 2022-06-01 NOTE — TELEPHONE ENCOUNTER
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

## 2022-06-02 NOTE — TELEPHONE ENCOUNTER
I spoke with patients wife Vicki Joyce [phone], informed her that Dr Vicenta Fan is not familiar with other folks with this syndrome  Willieeliceo Joyce understands that, she is wondering if Dr Vicenta Fan would be open to talking with Dr Osmel Palumbo regarding Keshawn's case  They are seeking a provider in our area vs [place]  According to Kevin Phillip was doing great for a year; they performed colonoscopy with biopsies to find out what type of yeast he was producing  Results were inconclusive re: they weren't able to find out the type of yeast  He was being treated with Nystatin & acidophilus  More recently he has been having positive breathalyzers without drinking  I asked if they contacted Dr Codie Riggs; he is not able to prescribe treatment without him being seen again  Please provide further recommendations

## 2022-06-03 NOTE — TELEPHONE ENCOUNTER
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

## 2022-06-03 NOTE — TELEPHONE ENCOUNTER
Agree with ginger  We do not see this at all  This is [facility] problem    Would refer to [facility] or [facility]

## 2022-06-03 NOTE — TELEPHONE ENCOUNTER
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 That was not mentioned  Faviola Walden remains on the acidophilus daily along with another herbal supplement Undecylenic Acid by Jesus (4) capsules BID  He has some oral nystatin tablets left over he could start taking  Until we can get him seen, I advised that they contact PCP Dr Lindsey Jiménez as she has prescribed the Nystatin in the past     I inquired more about his symptoms  Minh Pao reports Faviola Walden started with symptoms in February or March 2022 again   It was occurring once every 2 weeks, then more recently it increased to once a week  This week he experienced 2 episdoes  He develops slurred speech, unsteady balance, fatigue/ passing out on the couch + breathalyzers (uses a key chain breathalyzer)   I asked if they have every calibrated it? No  It's about 3years old  Dr Diana Perez also suggested they purchase a new one to ensure it's accuracy  B[place]se the positive breathalyzers; his symptoms coincide  Please contact wife Minh Young [phone] to schedule an appointment with Dr Pily Gallegos

## 2022-06-09 ENCOUNTER — TELEPHONE (OUTPATIENT)
Dept: GASTROENTEROLOGY | Facility: CLINIC | Age: 32
End: 2022-06-09

## 2022-06-13 ENCOUNTER — TELEPHONE (OUTPATIENT)
Dept: GASTROENTEROLOGY | Facility: CLINIC | Age: 32
End: 2022-06-13